# Patient Record
Sex: FEMALE | Race: WHITE | NOT HISPANIC OR LATINO | Employment: OTHER | URBAN - METROPOLITAN AREA
[De-identification: names, ages, dates, MRNs, and addresses within clinical notes are randomized per-mention and may not be internally consistent; named-entity substitution may affect disease eponyms.]

---

## 2020-02-07 ENCOUNTER — OFFICE VISIT (OUTPATIENT)
Dept: INTERNAL MEDICINE | Facility: CLINIC | Age: 35
End: 2020-02-07

## 2020-02-07 VITALS
RESPIRATION RATE: 16 BRPM | OXYGEN SATURATION: 100 % | HEIGHT: 67 IN | SYSTOLIC BLOOD PRESSURE: 102 MMHG | DIASTOLIC BLOOD PRESSURE: 60 MMHG | WEIGHT: 166.2 LBS | BODY MASS INDEX: 26.09 KG/M2 | HEART RATE: 80 BPM | TEMPERATURE: 97.8 F

## 2020-02-07 DIAGNOSIS — J30.9 ALLERGIC RHINITIS, UNSPECIFIED SEASONALITY, UNSPECIFIED TRIGGER: ICD-10-CM

## 2020-02-07 DIAGNOSIS — H69.83 DYSFUNCTION OF BOTH EUSTACHIAN TUBES: ICD-10-CM

## 2020-02-07 DIAGNOSIS — J01.90 ACUTE SINUSITIS, RECURRENCE NOT SPECIFIED, UNSPECIFIED LOCATION: ICD-10-CM

## 2020-02-07 DIAGNOSIS — C73 THYROID CANCER (HCC): Primary | ICD-10-CM

## 2020-02-07 PROCEDURE — 96372 THER/PROPH/DIAG INJ SC/IM: CPT | Performed by: NURSE PRACTITIONER

## 2020-02-07 PROCEDURE — 99203 OFFICE O/P NEW LOW 30 MIN: CPT | Performed by: NURSE PRACTITIONER

## 2020-02-07 RX ORDER — LEVOTHYROXINE SODIUM 0.15 MG/1
TABLET ORAL
COMMUNITY
End: 2020-06-19 | Stop reason: SDUPTHER

## 2020-02-07 RX ORDER — CETIRIZINE HYDROCHLORIDE 10 MG/1
10 TABLET ORAL DAILY
COMMUNITY
End: 2020-04-01

## 2020-02-07 RX ORDER — AMOXICILLIN AND CLAVULANATE POTASSIUM 875; 125 MG/1; MG/1
1 TABLET, FILM COATED ORAL 2 TIMES DAILY
Qty: 20 TABLET | Refills: 0 | Status: SHIPPED | OUTPATIENT
Start: 2020-02-07 | End: 2020-04-01

## 2020-02-07 RX ORDER — FLUTICASONE PROPIONATE 50 MCG
2 SPRAY, SUSPENSION (ML) NASAL DAILY
COMMUNITY

## 2020-02-07 RX ORDER — AZELASTINE 1 MG/ML
2 SPRAY, METERED NASAL 2 TIMES DAILY
Qty: 1 EACH | Refills: 12 | Status: SHIPPED | OUTPATIENT
Start: 2020-02-07

## 2020-02-07 RX ORDER — METHYLPREDNISOLONE ACETATE 80 MG/ML
80 INJECTION, SUSPENSION INTRA-ARTICULAR; INTRALESIONAL; INTRAMUSCULAR; SOFT TISSUE ONCE
Status: COMPLETED | OUTPATIENT
Start: 2020-02-07 | End: 2020-02-07

## 2020-02-07 RX ORDER — LEVOTHYROXINE SODIUM 175 UG/1
TABLET ORAL
COMMUNITY
End: 2020-11-03

## 2020-02-07 RX ADMIN — METHYLPREDNISOLONE ACETATE 80 MG: 80 INJECTION, SUSPENSION INTRA-ARTICULAR; INTRALESIONAL; INTRAMUSCULAR; SOFT TISSUE at 15:42

## 2020-02-07 NOTE — PROGRESS NOTES
Subjective   Kennedi Medina is a 34 y.o. female    Chief Complaint   Patient presents with   • Establish Care   • Thyroid Cancer     Was seeing Endo Dr. Cayden Win. But she moved. Discuss other recomendations as to who she can see.    • Allergic Rhinitis     Nasal congestion, drainage, runny nose. has been taking Mucinex, Flonase and Zyrtec     History of Present Illness     New pt here for to establish care.      Thyroid CA - dx'd in 2009; complete thyroidectomy in 09 as well, followed by one round of radioactive iodine.  Has been followed by Dr. Win, but she is leaving [place] and she needs referral to new Endo.      Allergic rhinitis - long standing problems with allergies.  She works at a horse farm and trains horses.  She feels that horse dander and hay are big triggers for her.  She does fine as long as she takes her Zyrtec and uses her flonase every day.  She failed to do so one day last week and now has terrible sinus pressure, congestion.  Has not tried any additional tx's for her sx's.      Thinks she has had the Hep A series.  Will ck with labs at [facility].      Past Medical History:   Diagnosis Date   • Thyroid cancer (CMS/[facility]) 2009    1 round of radioactive iodine     Past Surgical History:   Procedure Laterality Date   • RHINOPLASTY  06/2003   • THYROIDECTOMY  02/2009     No Known Allergies    Family History   Problem Relation Age of Onset   • Skin cancer Mother    • Skin cancer Father    • Skin cancer Paternal Uncle         melanoma   • Cancer Maternal Grandmother    • Cancer Maternal Grandfather    • Breast cancer Paternal Grandmother    • Cancer Paternal Grandfather    • No Known Problems Brother      Social History     Socioeconomic History   • Marital status:      Spouse name: Not on file   • Number of children: Not on file   • Years of education: Not on file   • Highest education level: Not on file   Tobacco Use   • Smoking status: Never Smoker   • Smokeless tobacco: Never Used   Substance  and Sexual Activity   • Alcohol use: Yes     Alcohol/week: 2.0 - 5.0 standard drinks     Types: 2 - 5 Standard drinks or equivalent per week     Comment: 2-5 drinks per week   • Drug use: Never   • Sexual activity: Yes     Partners: Male     Birth control/protection: None     Comment:          The following portions of the patient's history were reviewed and updated as appropriate: allergies, current medications, past family history, past medical history, past social history, past surgical history and problem list.    Current Outpatient Medications:   •  cetirizine (zyrTEC) 10 MG tablet, Take 10 mg by mouth Daily., Disp: , Rfl:   •  fluticasone (FLONASE) 50 MCG/ACT nasal spray, 2 sprays into the nostril(s) as directed by provider Daily., Disp: , Rfl:   •  levothyroxine (SYNTHROID, LEVOTHROID) 150 MCG tablet, Take 1 tablet by mouth daily. Monday-Friday., Disp: , Rfl:   •  levothyroxine (SYNTHROID, LEVOTHROID) 175 MCG tablet, Take 1 tablet by mouth on Saturday and Sunday, Disp: , Rfl:   •  amoxicillin-clavulanate (AUGMENTIN) 875-125 MG per tablet, Take 1 tablet by mouth 2 (Two) Times a Day., Disp: 20 tablet, Rfl: 0  •  azelastine (ASTELIN) 0.1 % nasal spray, 2 sprays into the nostril(s) as directed by provider 2 (Two) Times a Day. Use in each nostril as directed, Disp: 1 each, Rfl: 12     Review of Systems   Constitutional: Negative for chills, fatigue and fever.   HENT: Positive for congestion, postnasal drip, rhinorrhea, sinus pressure and sinus pain.    Respiratory: Negative for cough, chest tightness and shortness of breath.    Cardiovascular: Negative for chest pain.   Gastrointestinal: Negative for abdominal pain, diarrhea, nausea and vomiting.   Endocrine: Negative for cold intolerance and heat intolerance.   Musculoskeletal: Negative for arthralgias.   Neurological: Negative for dizziness.       Objective   Physical Exam   Constitutional: She is oriented to person, place, and time. She appears  "well-developed and well-nourished.   HENT:   Head: Normocephalic and atraumatic.   Right Ear: A middle ear effusion is present.   Left Ear: A middle ear effusion is present.   Nose: Mucosal edema and rhinorrhea present. Right sinus exhibits maxillary sinus tenderness and frontal sinus tenderness. Left sinus exhibits maxillary sinus tenderness and frontal sinus tenderness.   Mouth/Throat: Posterior oropharyngeal erythema present.   Eyes: Pupils are equal, round, and reactive to light. Conjunctivae and EOM are normal.   Neck: Normal range of motion.   Cardiovascular: Normal rate, regular rhythm and normal heart sounds.   Pulmonary/Chest: Effort normal and breath sounds normal.   Abdominal: Soft. Bowel sounds are normal.   Musculoskeletal: Normal range of motion.   Neurological: She is alert and oriented to person, place, and time. She has normal reflexes.   Skin: Skin is warm and dry.   Psychiatric: She has a normal mood and affect. Her behavior is normal. Judgment and thought content normal.     Vitals:    02/07/20 1457   BP: 102/60   Pulse: 80   Resp: 16   Temp: 97.8 °F (36.6 °C)   TempSrc: Temporal   SpO2: 100%   Weight: 75.4 kg (166 lb 3.2 oz)   Height: 169.2 cm (66.61\")         Assessment/Plan   Kennedi was seen today for establish care, thyroid cancer and allergic rhinitis.    Diagnoses and all orders for this visit:    Thyroid cancer (CMS/[facility])  -     Ambulatory Referral to Endocrinology    Acute sinusitis, recurrence not specified, unspecified location  -     amoxicillin-clavulanate (AUGMENTIN) 875-125 MG per tablet; Take 1 tablet by mouth 2 (Two) Times a Day.  -     methylPREDNISolone acetate (DEPO-medrol) injection 80 mg    Dysfunction of both eustachian tubes  -     azelastine (ASTELIN) 0.1 % nasal spray; 2 sprays into the nostril(s) as directed by provider 2 (Two) Times a Day. Use in each nostril as directed  -     methylPREDNISolone acetate (DEPO-medrol) injection 80 mg    Allergic rhinitis, unspecified " seasonality, unspecified trigger  -     azelastine (ASTELIN) 0.1 % nasal spray; 2 sprays into the nostril(s) as directed by provider 2 (Two) Times a Day. Use in each nostril as directed  -     methylPREDNISolone acetate (DEPO-medrol) injection 80 mg    Referred to Endo  Covered with Augmentin for sinusitis  Will add Astelin to allergy meds - continue with Zyrtec and flonase  Steroid injection given in office  Declines allergist referral for now  Return in about 3 months (around 5/7/2020) for Annual.

## 2020-04-01 ENCOUNTER — TELEMEDICINE (OUTPATIENT)
Dept: ENDOCRINOLOGY | Facility: CLINIC | Age: 35
End: 2020-04-01

## 2020-04-01 ENCOUNTER — TELEPHONE (OUTPATIENT)
Dept: ENDOCRINOLOGY | Facility: CLINIC | Age: 35
End: 2020-04-01

## 2020-04-01 DIAGNOSIS — C73 THYROID CANCER (HCC): Primary | ICD-10-CM

## 2020-04-01 DIAGNOSIS — E89.0 POSTSURGICAL HYPOTHYROIDISM: ICD-10-CM

## 2020-04-01 PROBLEM — J30.2 SEASONAL ALLERGIES: Status: ACTIVE | Noted: 2020-04-01

## 2020-04-01 PROCEDURE — 99243 OFF/OP CNSLTJ NEW/EST LOW 30: CPT | Performed by: INTERNAL MEDICINE

## 2020-04-01 RX ORDER — LEVOCETIRIZINE DIHYDROCHLORIDE 5 MG/1
5 TABLET, FILM COATED ORAL DAILY
COMMUNITY

## 2020-04-01 NOTE — TELEPHONE ENCOUNTER
The office is closed today, Thursday and Friday.  Unable to leave a message.    I will call the office on Monday, to request records, since I don't have a fax to fax the request to.

## 2020-04-01 NOTE — TELEPHONE ENCOUNTER
----- Message from Dina Hernandes MD sent at 4/1/2020 10:40 AM EDT -----  Could you please assist in obtaining prior Endocrine records for patient from the office of Dr. CINDI Win? Patient last seen approximately 1 year ago.     If possible, request most recent office notes, labs, imaging, surgical pathology if available.    Thank you

## 2020-04-01 NOTE — PROGRESS NOTES
Chief Complaint   Patient presents with   • Thyroid Cancer   • Establish Care        New patient who is being seen in consultation regarding thyroid cancer at the request of Kelly Johnson AP* HPI   Kennedi Medina is a 34 y.o. female who presents to establish care with a new provider for thyroid cancer, postsurgical hypothyroidism.  This was an audio and video enabled telemedicine encounter.        She was previously followed by Dr. CARL Win was last seen approximately 1 year ago.    Patient reports that her family number noticed a visible nodule around Christmas 2008.  She later had an FNA which was suspicious for carcinoma.  She underwent thyroidectomy in approximately March/April 2009 in [facility].  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  She reports she had radioactive iodine ablation in approximately May 2009.  She reports that she did not require hospitalization following administration of radioactive iodine.  Patient reports that she underwent whole-body scan following radioactive iodine again about 6 months later.  She reports that she may have had one additional scan but is not sure and was told that she does not need further imaging.  Most recently, she has been followed with annual labs.  She reports no recent change in her dose of thyroid hormone.  Patient is currently taking levothyroxine 150 mcg Monday through Friday and 175 mcg Saturday and Sunday.  She does report that her TSH has been Slightly low in the past but she is not sure of the exact goal.    Patient does report some recent intentional weight loss.  Patient denies any recent changes in bowel habits.  Patient denies any palpitations or increased anxiety.  Patient reports energy level is good.  Patient denies any difficulty breathing, difficulty swallowing or changes in her neck.    Past Medical History:   Diagnosis Date   • Hypothyroidism    •  Thyroid cancer (CMS/[facility]) 2009    1 round of radioactive iodine     Past Surgical History:   Procedure Laterality Date   • RHINOPLASTY  06/2003   • THYROIDECTOMY  02/2009      Family History   Problem Relation Age of Onset   • Skin cancer Mother    • Skin cancer Father    • Skin cancer Paternal Uncle         melanoma   • Cancer Maternal Grandmother    • Cancer Maternal Grandfather    • Breast cancer Paternal Grandmother    • Cancer Paternal Grandfather    • No Known Problems Brother       Social History     Socioeconomic History   • Marital status:      Spouse name: Not on file   • Number of children: Not on file   • Years of education: Not on file   • Highest education level: Not on file   Tobacco Use   • Smoking status: Never Smoker   • Smokeless tobacco: Never Used   Substance and Sexual Activity   • Alcohol use: Yes     Alcohol/week: 2.0 - 5.0 standard drinks     Types: 2 - 5 Standard drinks or equivalent per week     Comment: 2-5 drinks per week   • Drug use: Never   • Sexual activity: Yes     Partners: Male     Birth control/protection: None     Comment:       No Known Allergies   Current Outpatient Medications on File Prior to Visit   Medication Sig Dispense Refill   • azelastine (ASTELIN) 0.1 % nasal spray 2 sprays into the nostril(s) as directed by provider 2 (Two) Times a Day. Use in each nostril as directed 1 each 12   • fluticasone (FLONASE) 50 MCG/ACT nasal spray 2 sprays into the nostril(s) as directed by provider Daily.     • levocetirizine (XYZAL) 5 MG tablet Take 5 mg by mouth Daily.     • levothyroxine (SYNTHROID, LEVOTHROID) 150 MCG tablet Take 1 tablet by mouth daily. Monday-Friday.     • levothyroxine (SYNTHROID, LEVOTHROID) 175 MCG tablet Take 1 tablet by mouth on Saturday and Sunday     • [DISCONTINUED] amoxicillin-clavulanate (AUGMENTIN) 875-125 MG per tablet Take 1 tablet by mouth 2 (Two) Times a Day. 20 tablet 0   • [DISCONTINUED] cetirizine (zyrTEC) 10 MG tablet Take 10 mg by  mouth Daily.       No current facility-administered medications on file prior to visit.         Review of Systems   Constitutional: Negative for fatigue and unexpected weight gain.   HENT: Negative for trouble swallowing and voice change.    Eyes: Negative for itching and visual disturbance.   Respiratory: Negative for cough and shortness of breath.    Cardiovascular: Negative for chest pain and palpitations.   Gastrointestinal: Negative for constipation and diarrhea.   Endocrine: Negative for cold intolerance and heat intolerance.   Musculoskeletal: Negative for arthralgias and myalgias.   Skin: Negative for dry skin and rash.   Neurological: Negative for tremors and numbness.   Psychiatric/Behavioral: Negative for sleep disturbance. The patient is nervous/anxious.         There were no vitals filed for this visit.There is no height or weight on file to calculate BMI.     Physical Exam   General: Well-appearing, no acute distress.    Labs/Imaging  No recent labs or imaging available.    Assessment and Plan    Kennedi was seen today for thyroid cancer and establish care.    Diagnoses and all orders for this visit:    Thyroid cancer (CMS/[facility])  -Patient reports total thyroidectomy for encapsulated papillary microcarcinoma in approximately March 2009.  -Patient reports that she subsequently underwent radioactive iodine treatment, per her history, this was likely a remnant ablation.  -Patient reports post therapy whole body scan was normal and she is recently been monitored with annual labs and ultrasound.  -No previous records available at time of visit.  Patient previously followed by Dr. Win.  Will request records from her office to verify the above history.  -On levothyroxine 150 mcg Monday through Friday and 175 mcg on Saturday and Sunday.  Patient denies any recent dose change.  -Patient reports last labs were approximately 1 year ago, but she is likely due for thyroglobulin and thyroid function testing which were  ordered today.  -We will determine TSH will need for subsequent screening with imaging upon review of patient records.    Postsurgical hypothyroidism  -On levothyroxine 150 mcg Monday through Friday and 175 mcg on Saturday and Sunday.   -Continue current levothyroxine dosing for now.  -Obtain TFTs, as above  -We will determine TSH goal after prior records reviewed.      Addendum dated 4/9/20  Received results of some prior endocrinology labs- still awaiting additional labs, imaging, office notes.  Labs dated 3/15/2018  TSH 2.68  Free T4 1.34    Labs dated 3/16/2017  TSH 0.363  Free T4 1.49    Labs dated 1/20/2016  TSH 0.175  Free T4 1.54    Labs dated 9/8/2015  TSH 0.242  Free T3 3.3  Free T4 1.40    Received office note dated 3/20/2019  HPI notes that patient had subtotal thyroidectomy in [place] in February 2009, she subsequently received 50 mCi of JEAN-BAPTISTE in June 2009 for a 1.8 cm papillary carcinoma of the left thyroid with clear margins.  Whole-body scan showed uptake in the sternal notch area.  Whole-body scan in 2010 was negative.  TSH was previously noted to be suppressed 175 mcg daily.  Thyroglobulin was undetectable.  Thyroid ultrasound at that visit revealed postsurgical neck, no evidence of recurrent tissue.  Small bilateral lymph nodes none of which were entirely suspicious.  At that visit, provider recommended TSH be kept less than 1.    Received labs from March 15, 2018  TSH 2.68  Free T4 1.34  Thyroglobulin less than 0.1  Thyroglobulin antibody less than 0.4    Received labs dated March 16, 2017  TSH 0.363  Free T4 1.49  Thyroglobulin antibody less than 0.4  Thyroglobulin less than 0.1       Return in about 3 months (around 7/1/2020) for Next scheduled follow up. The patient was instructed to contact the clinic with any interval questions or concerns.    Dina Hernandes MD     Please note that portions of this document were completed using a voice recognition program. Efforts were made to edit the  dictations, but occasionally words are mis-transcribed.

## 2020-04-08 ENCOUNTER — TELEPHONE (OUTPATIENT)
Dept: ENDOCRINOLOGY | Facility: CLINIC | Age: 35
End: 2020-04-08

## 2020-04-09 DIAGNOSIS — E89.0 POSTSURGICAL HYPOTHYROIDISM: ICD-10-CM

## 2020-04-09 DIAGNOSIS — E89.0 POSTSURGICAL HYPOTHYROIDISM: Primary | ICD-10-CM

## 2020-04-09 DIAGNOSIS — C73 THYROID CANCER (HCC): Primary | ICD-10-CM

## 2020-04-09 NOTE — TELEPHONE ENCOUNTER
Note from Dr. Hernandes:    Received some labs, but still need last office note, prior thyroglobulin results, most recent imaging (thyroid US) results.

## 2020-04-14 DIAGNOSIS — C73 THYROID CANCER (HCC): Primary | ICD-10-CM

## 2020-06-19 ENCOUNTER — TELEPHONE (OUTPATIENT)
Dept: ENDOCRINOLOGY | Facility: CLINIC | Age: 35
End: 2020-06-19

## 2020-06-19 DIAGNOSIS — E89.0 POSTSURGICAL HYPOTHYROIDISM: Primary | ICD-10-CM

## 2020-06-19 RX ORDER — LEVOTHYROXINE SODIUM 0.15 MG/1
TABLET ORAL
Qty: 60 TABLET | Refills: 1 | Status: SHIPPED | OUTPATIENT
Start: 2020-06-19 | End: 2020-11-03

## 2020-06-19 NOTE — TELEPHONE ENCOUNTER
----- Message from Kennedi Medina sent at 6/17/2020  7:53 AM EDT -----  Regarding: Prescription Question  Contact: [phone]  Hi, I am running low on my 150 levithyroxin (sp?) Would you be able to call more into that for me? I use [facility] the phone number is [phone]. I also just remembered that I was supposed to do labs at some point in June and completely forgot. Where can I go to get those labs done and do I need to bring anything with me or do they know I'm coming? Thank you so much

## 2020-06-22 ENCOUNTER — TELEPHONE (OUTPATIENT)
Dept: ENDOCRINOLOGY | Facility: CLINIC | Age: 35
End: 2020-06-22

## 2020-06-22 NOTE — TELEPHONE ENCOUNTER
[facility] IS REQUESTING A RETURN CALL REGARDING THIS PATIENT'S LEVOTHYROXINE ORDER. THEY ARE NEEDING SOME CLARIFICATION AND THE PATIENT HAS ASKED [facility] TO PUT A RUSH ON REFILLING THIS RX.    CALL BACK [phone]     REF [id number]

## 2020-06-22 NOTE — TELEPHONE ENCOUNTER
[facility] needed to clarify if Dr. Hernandes wanted Generic or Name brand for medication.    Per verbal order from Dr. Hernandes, she does not have a preference over generic or name brand.    Pharmacy will be dispensing the generic brand for Levothyroxine

## 2020-06-23 ENCOUNTER — LAB (OUTPATIENT)
Dept: LAB | Facility: HOSPITAL | Age: 35
End: 2020-06-23

## 2020-06-23 ENCOUNTER — LAB (OUTPATIENT)
Dept: ENDOCRINOLOGY | Facility: CLINIC | Age: 35
End: 2020-06-23

## 2020-06-23 DIAGNOSIS — C73 THYROID CANCER (HCC): ICD-10-CM

## 2020-06-23 PROCEDURE — 36415 COLL VENOUS BLD VENIPUNCTURE: CPT

## 2020-06-23 PROCEDURE — 84432 ASSAY OF THYROGLOBULIN: CPT | Performed by: INTERNAL MEDICINE

## 2020-06-23 PROCEDURE — 84443 ASSAY THYROID STIM HORMONE: CPT | Performed by: INTERNAL MEDICINE

## 2020-06-23 PROCEDURE — 86800 THYROGLOBULIN ANTIBODY: CPT | Performed by: INTERNAL MEDICINE

## 2020-06-23 PROCEDURE — 84439 ASSAY OF FREE THYROXINE: CPT | Performed by: INTERNAL MEDICINE

## 2020-06-24 LAB
T4 FREE SERPL-MCNC: 1.52 NG/DL (ref 0.93–1.7)
TSH SERPL DL<=0.05 MIU/L-ACNC: 1.72 UIU/ML (ref 0.27–4.2)

## 2020-06-26 LAB — REF LAB TEST METHOD: NORMAL

## 2020-07-02 ENCOUNTER — OFFICE VISIT (OUTPATIENT)
Dept: ENDOCRINOLOGY | Facility: CLINIC | Age: 35
End: 2020-07-02

## 2020-07-02 VITALS
WEIGHT: 169.4 LBS | HEIGHT: 67 IN | OXYGEN SATURATION: 99 % | DIASTOLIC BLOOD PRESSURE: 60 MMHG | BODY MASS INDEX: 26.59 KG/M2 | SYSTOLIC BLOOD PRESSURE: 122 MMHG | HEART RATE: 76 BPM

## 2020-07-02 DIAGNOSIS — C73 THYROID CANCER (HCC): Primary | ICD-10-CM

## 2020-07-02 DIAGNOSIS — E89.0 POSTSURGICAL HYPOTHYROIDISM: ICD-10-CM

## 2020-07-02 PROCEDURE — 99213 OFFICE O/P EST LOW 20 MIN: CPT | Performed by: INTERNAL MEDICINE

## 2020-07-02 NOTE — PROGRESS NOTES
"Chief Complaint   Patient presents with   • Follow-up   • Thyroid Cancer        HPI   Kennedi Mukherjee is a 35 y.o. female had concerns including Follow-up and Thyroid Cancer.      Patient reports that she has been doing well in the interim since her last visit.  She denies any significant health changes or changes in medications.  She denies any concerning changes in her neck, difficulty breathing or difficulty swallowing. She continues on levothyroxine 150 mcg daily Monday-Friday and 175 mcg Saturday and Sunday. She denies weight changes, palpiltations, changes in bowel habits, heat intolerance or cold intolerance. She takes her medication first thing in the morning on an empty stomach and denies any missed doses.    The following portions of the patient's history were reviewed and updated as appropriate: allergies and past social history. medications    Review of Systems   Constitutional: Negative for unexpected weight gain and unexpected weight loss.   HENT: Negative for trouble swallowing and voice change.    Respiratory: Negative for cough and shortness of breath.    Cardiovascular: Negative for chest pain and palpitations.   Gastrointestinal: Negative for constipation and diarrhea.   Endocrine: Negative for cold intolerance and heat intolerance.        /60   Pulse 76   Ht 169.2 cm (66.61\")   Wt 76.8 kg (169 lb 6.4 oz)   SpO2 99%   BMI 26.84 kg/m²      Physical Exam      Constitutional:  well developed; well nourished  no acute distress  appears stated age   ENT/Thyroid: surgically absent   Eyes: Conjunctiva: clear   Respiratory:  breathing is unlabored  clear to auscultation bilaterally   Cardiovascular:  regular rate and rhythm   Chest:  Not performed.   Abdomen: soft, non-tender; no masses   : Not performed.   Musculoskeletal: Not performed   Skin: dry and warm   Neuro: mental status, speech normal   Psych: mood and affect are within normal limits       Labs/Imaging    Results for KENNEDI MUKHERJEE " (MRN [id number]) as of 7/2/2020 15:22   Ref. Range 6/23/2020 11:51   TSH Baseline Latest Ref Range: 0.270 - 4.200 uIU/mL 1.720   Free T4 Latest Ref Range: 0.93 - 1.70 ng/dL 1.52     Thyroglobulin antibody less than 1  Thyroglobulin 0.1    Kennedi was seen today for follow-up and thyroid cancer.    Diagnoses and all orders for this visit:    Thyroid cancer (CMS/[facility])  -Status post subtotal thyroidectomy in February 2009 followed by 50 mCi of radioactive iodine in June 2009 for a 1.8 cm papillary thyroid carcinoma.  Whole-body scan in 2010 reportedly negative.  - Previously followed by Dr. Win, last US 1 year ago  - recent TG and TGAb as above  - given available data, as patient is now 11 years out from diagnosis, she would likely classify as low risk  - aim for TSH <2.5  - Will plan to obtain baseline thyroid US    Postsurgical hypothyroidism  -Currently taking levothyroxine 175 mcg Saturday and Sunday, 150 mcg Monday- Friday  - TSH at goal, continue current dose  - reviewed appropriate administration of thyroid hormone  - discussed importance of thyroid hormone in pregnancy, patient to contact the clinic if she were to discover she is pregnant    Patient instructed to contact the office in the interim between visits with any concerning changes.    Addendum dated 12/14/2020  Received lab results dated 3/20/2019  Thyroglobulin less than 0.1  Antithyroglobulin antibodies less than 1 TSH 1.68  Free T4-5 point    Return in about 1 year (around 7/2/2021) for Next scheduled follow up. The patient was instructed to contact the clinic with any interval questions or concerns.    Dina Hernandes MD   Endocrinologist    Please note that portions of this document were completed with a voice recognition program. Efforts were made to edit the dictations, but occasionally words are mis-transcribed.

## 2020-07-30 ENCOUNTER — OFFICE VISIT (OUTPATIENT)
Dept: ENDOCRINOLOGY | Facility: CLINIC | Age: 35
End: 2020-07-30

## 2020-07-30 DIAGNOSIS — Z85.850 HISTORY OF THYROID CANCER: Primary | ICD-10-CM

## 2020-07-30 PROCEDURE — 76536 US EXAM OF HEAD AND NECK: CPT | Performed by: INTERNAL MEDICINE

## 2020-07-30 NOTE — PROGRESS NOTES
Post-operative Neck Ultrasound  [facility] Endocrinology    PT: Kennedi Medina  : 1985  Date:  20  Indication: History of thyroid cancer.    Technique: Real time high resolution imaging of the anterior neck was performed in longitudinal and transverse planes.    Findings:   Compartment / Level I  (Submandibular): no abnormal lymph nodules bilaterally  Compartment / Level II  (Suprahyoid parajugular): no abnormal lymph nodules bilaterally  Compartment / Level III  (Infrahyoid supracricoid parajugular): no abnormal lymph nodules bilaterally  Compartment / Level IV (Infracricoid parajugular): no abnormal lymph nodules bilaterally  Compartment / Level V (Posterior cervical triangle): no abnormal lymph nodules bilaterally  Compartment / Level VI (Central compartment): no abnormal lymph nodules identified  Compartment / Level VII (Substernal space): no abnormal lymph nodules bilaterally    Impression: The structures of the neck are shifted medially as expected post thyroidectomy. The thyroid bed is unremarkable.   No abnormal lymph nodes were identified in surgical anatomic compartments I - VII.      Recommendation: Repeat Neck US recommended as clinically indicated.

## 2020-08-21 DIAGNOSIS — M79.645 FINGER PAIN, LEFT: Primary | ICD-10-CM

## 2020-09-02 ENCOUNTER — TELEPHONE (OUTPATIENT)
Dept: INTERNAL MEDICINE | Facility: CLINIC | Age: 35
End: 2020-09-02

## 2020-09-02 NOTE — TELEPHONE ENCOUNTER
Referral faxed to Edvin at [facility] location as requested by patient. They will contact patient to schedule.

## 2020-11-03 DIAGNOSIS — E89.0 POSTSURGICAL HYPOTHYROIDISM: Primary | ICD-10-CM

## 2020-11-03 DIAGNOSIS — E89.0 POSTSURGICAL HYPOTHYROIDISM: ICD-10-CM

## 2020-11-03 RX ORDER — LEVOTHYROXINE SODIUM 0.15 MG/1
TABLET ORAL
Qty: 60 TABLET | Refills: 1 | Status: SHIPPED | OUTPATIENT
Start: 2020-11-03 | End: 2020-11-09 | Stop reason: SDUPTHER

## 2020-11-03 RX ORDER — LEVOTHYROXINE SODIUM 175 UG/1
175 TABLET ORAL DAILY
Qty: 24 TABLET | Refills: 1 | Status: SHIPPED | OUTPATIENT
Start: 2020-11-03 | End: 2020-11-09 | Stop reason: SDUPTHER

## 2020-11-03 NOTE — TELEPHONE ENCOUNTER
Patient requesting refill for Levothyroxine.    LOV:  07/30/20  Future visit:  07/21/21    Last refill:  06/19/20  Q.  60  R.  1      [facility].    Rx pending.

## 2020-11-09 DIAGNOSIS — E89.0 POSTSURGICAL HYPOTHYROIDISM: ICD-10-CM

## 2020-11-09 RX ORDER — LEVOTHYROXINE SODIUM 175 UG/1
175 TABLET ORAL DAILY
Qty: 24 TABLET | Refills: 0 | Status: SHIPPED | OUTPATIENT
Start: 2020-11-09 | End: 2020-11-09 | Stop reason: SDUPTHER

## 2020-11-09 RX ORDER — LEVOTHYROXINE SODIUM 0.15 MG/1
TABLET ORAL
Qty: 60 TABLET | Refills: 1 | Status: SHIPPED | OUTPATIENT
Start: 2020-11-09 | End: 2021-04-27 | Stop reason: SDUPTHER

## 2020-11-09 RX ORDER — LEVOTHYROXINE SODIUM 175 UG/1
175 TABLET ORAL DAILY
Qty: 24 TABLET | Refills: 1 | Status: SHIPPED | OUTPATIENT
Start: 2020-11-09 | End: 2021-04-27 | Stop reason: SDUPTHER

## 2020-11-09 RX ORDER — LEVOTHYROXINE SODIUM 0.15 MG/1
TABLET ORAL
Qty: 60 TABLET | Refills: 0 | Status: SHIPPED | OUTPATIENT
Start: 2020-11-09 | End: 2020-11-09 | Stop reason: SDUPTHER

## 2021-04-27 DIAGNOSIS — E89.0 POSTSURGICAL HYPOTHYROIDISM: ICD-10-CM

## 2021-04-27 RX ORDER — LEVOTHYROXINE SODIUM 0.15 MG/1
TABLET ORAL
Qty: 60 TABLET | Refills: 1 | Status: SHIPPED | OUTPATIENT
Start: 2021-04-27 | End: 2021-07-28 | Stop reason: SDUPTHER

## 2021-04-27 RX ORDER — LEVOTHYROXINE SODIUM 175 UG/1
175 TABLET ORAL DAILY
Qty: 24 TABLET | Refills: 1 | Status: SHIPPED | OUTPATIENT
Start: 2021-04-27 | End: 2021-07-28 | Stop reason: SDUPTHER

## 2021-04-27 NOTE — TELEPHONE ENCOUNTER
Patient requesting refills for her Levothyroxine 150 mcg and 175 mcg via [employer] portal.    LOV:  07/2/20  Future visit:  07/12/21    Rx pending.

## 2021-07-15 ENCOUNTER — LAB (OUTPATIENT)
Dept: LAB | Facility: HOSPITAL | Age: 36
End: 2021-07-15

## 2021-07-15 ENCOUNTER — OFFICE VISIT (OUTPATIENT)
Dept: ENDOCRINOLOGY | Facility: CLINIC | Age: 36
End: 2021-07-15

## 2021-07-15 VITALS
SYSTOLIC BLOOD PRESSURE: 122 MMHG | BODY MASS INDEX: 27.59 KG/M2 | HEART RATE: 68 BPM | HEIGHT: 67 IN | OXYGEN SATURATION: 99 % | WEIGHT: 175.8 LBS | DIASTOLIC BLOOD PRESSURE: 72 MMHG

## 2021-07-15 DIAGNOSIS — C73 THYROID CANCER (HCC): Primary | ICD-10-CM

## 2021-07-15 DIAGNOSIS — E89.0 POSTSURGICAL HYPOTHYROIDISM: ICD-10-CM

## 2021-07-15 LAB
T4 FREE SERPL-MCNC: 1.52 NG/DL (ref 0.93–1.7)
TSH SERPL DL<=0.05 MIU/L-ACNC: 1.18 UIU/ML (ref 0.27–4.2)

## 2021-07-15 PROCEDURE — 99214 OFFICE O/P EST MOD 30 MIN: CPT | Performed by: INTERNAL MEDICINE

## 2021-07-15 PROCEDURE — 84439 ASSAY OF FREE THYROXINE: CPT | Performed by: INTERNAL MEDICINE

## 2021-07-15 PROCEDURE — 36415 COLL VENOUS BLD VENIPUNCTURE: CPT | Performed by: INTERNAL MEDICINE

## 2021-07-15 PROCEDURE — 84432 ASSAY OF THYROGLOBULIN: CPT | Performed by: INTERNAL MEDICINE

## 2021-07-15 PROCEDURE — 86800 THYROGLOBULIN ANTIBODY: CPT | Performed by: INTERNAL MEDICINE

## 2021-07-15 PROCEDURE — 84443 ASSAY THYROID STIM HORMONE: CPT | Performed by: INTERNAL MEDICINE

## 2021-07-15 NOTE — PROGRESS NOTES
"Chief Complaint   Patient presents with   • Follow-up   • Hypothyroidism   • Thyroid Cancer        HPI   Kennedi Mukherjee is a 36 y.o. female had concerns including Follow-up, Hypothyroidism, and Thyroid Cancer.      Patient denies any interval health changes since her last visit.  She does report that her allergies have been worse past few weeks but no other new concerns.  Regarding her history of thyroid cancer, she denies any notable neck changes, difficulty breathing, difficulty swallowing.  Regarding her postoperative hypothyroidism, she continues on levothyroxine 150 mcg Monday through Friday, 175 mcg Saturday and Sunday.  She denies any weight changes, changes in bowel habits, palpitations.    The following portions of the patient's history were reviewed and updated as appropriate: allergies, current medications and past social history.    Review of Systems   Constitutional: Negative for unexpected weight gain and unexpected weight loss.   Cardiovascular: Negative for palpitations.   Gastrointestinal: Negative for constipation and diarrhea.   Endocrine: Negative for cold intolerance and heat intolerance.      /72   Pulse 68   Ht 169.2 cm (66.61\")   Wt 79.7 kg (175 lb 12.8 oz)   SpO2 99%   BMI 27.86 kg/m²      Physical Exam      Constitutional:  well developed; well nourished  no acute distress  appears stated age   ENT/Thyroid: surgically absent   Eyes: Conjunctiva: clear   Respiratory:  breathing is unlabored  clear to auscultation bilaterally   Cardiovascular:  regular rate and rhythm   Chest:  Not performed.   Abdomen: soft, non-tender; no masses   : Not performed.   Musculoskeletal: Not performed   Skin: dry and warm   Neuro: mental status, speech normal   Psych: mood and affect are within normal limits       Labs/Imaging  Results for KENNEDI MUKHERJEE (MRN [id number]) as of 7/15/2021 13:50   Ref. Range 6/23/2020 11:51   TSH Baseline Latest Ref Range: 0.270 - 4.200 uIU/mL 1.720   Free T4 Latest " Ref Range: 0.93 - 1.70 ng/dL 1.52       Diagnoses and all orders for this visit:    1. Thyroid cancer (CMS/HCC) (Primary)  Status post subtotal thyroidectomy in February 2009 followed by 50 mcg of radioactive iodine in June 2009 41.8 cm papillary thyroid cancer.  Whole-body scan reportedly negative in 2010.  Most recent thyroglobulin levels 0.1 in June 2020.  Thyroid ultrasound obtained July 2020, unremarkable  SCOOTER low risk based on available data, repeat labs today  Examination of thyroid gland unremarkable    2. Postsurgical hypothyroidism  Currently taking levothyroxine 150 mcg Monday through Friday, 175 on Saturday and Sunday  Clinically euthyroid  Goal TSH less than 2.5, repeat labs today  Reviewed increased thyroid hormone requirements in the event of pregnancy, patient will contact the office in the interim between visits if this were to occur.      Return in about 1 year (around 7/15/2022). The patient was instructed to contact the clinic with any interval questions or concerns.    Dina Hernandes MD   Endocrinologist    Please note that portions of this document were completed with a voice recognition program. Efforts were made to edit the dictations, but occasionally words are mis-transcribed.

## 2021-07-26 LAB — REF LAB TEST METHOD: NORMAL

## 2021-07-28 RX ORDER — LEVOTHYROXINE SODIUM 175 UG/1
TABLET ORAL
Qty: 24 TABLET | Refills: 1 | Status: SHIPPED | OUTPATIENT
Start: 2021-07-28 | End: 2021-09-22

## 2021-07-28 RX ORDER — LEVOTHYROXINE SODIUM 0.15 MG/1
TABLET ORAL
Qty: 60 TABLET | Refills: 1 | Status: SHIPPED | OUTPATIENT
Start: 2021-07-28 | End: 2021-09-22

## 2021-09-22 DIAGNOSIS — E89.0 POSTSURGICAL HYPOTHYROIDISM: ICD-10-CM

## 2021-09-22 RX ORDER — LEVOTHYROXINE SODIUM 175 UG/1
TABLET ORAL
Qty: 24 TABLET | Refills: 5 | Status: SHIPPED | OUTPATIENT
Start: 2021-09-22 | End: 2022-06-08 | Stop reason: SDUPTHER

## 2021-09-22 RX ORDER — LEVOTHYROXINE SODIUM 0.15 MG/1
TABLET ORAL
Qty: 60 TABLET | Refills: 5 | Status: SHIPPED | OUTPATIENT
Start: 2021-09-22 | End: 2022-06-08 | Stop reason: SDUPTHER

## 2021-10-19 ENCOUNTER — OFFICE VISIT (OUTPATIENT)
Dept: INTERNAL MEDICINE | Facility: CLINIC | Age: 36
End: 2021-10-19

## 2021-10-19 VITALS
HEIGHT: 67 IN | OXYGEN SATURATION: 98 % | TEMPERATURE: 97.3 F | SYSTOLIC BLOOD PRESSURE: 118 MMHG | DIASTOLIC BLOOD PRESSURE: 76 MMHG | WEIGHT: 174 LBS | RESPIRATION RATE: 16 BRPM | HEART RATE: 87 BPM | BODY MASS INDEX: 27.31 KG/M2

## 2021-10-19 DIAGNOSIS — R41.840 ATTENTION AND CONCENTRATION DEFICIT: ICD-10-CM

## 2021-10-19 DIAGNOSIS — Z01.84 IMMUNITY STATUS TESTING: ICD-10-CM

## 2021-10-19 DIAGNOSIS — E55.9 VITAMIN D DEFICIENCY: ICD-10-CM

## 2021-10-19 DIAGNOSIS — Z01.419 ROUTINE GYNECOLOGICAL EXAMINATION: ICD-10-CM

## 2021-10-19 DIAGNOSIS — C73 THYROID CANCER (HCC): ICD-10-CM

## 2021-10-19 DIAGNOSIS — Z12.31 ENCOUNTER FOR SCREENING MAMMOGRAM FOR MALIGNANT NEOPLASM OF BREAST: ICD-10-CM

## 2021-10-19 DIAGNOSIS — Z11.59 ENCOUNTER FOR HEPATITIS C SCREENING TEST FOR LOW RISK PATIENT: ICD-10-CM

## 2021-10-19 DIAGNOSIS — E89.0 POSTSURGICAL HYPOTHYROIDISM: ICD-10-CM

## 2021-10-19 DIAGNOSIS — Z80.3 FAMILY HISTORY OF BREAST CANCER IN FEMALE: ICD-10-CM

## 2021-10-19 DIAGNOSIS — Z00.00 ROUTINE GENERAL MEDICAL EXAMINATION AT A HEALTH CARE FACILITY: Primary | ICD-10-CM

## 2021-10-19 PROCEDURE — 99395 PREV VISIT EST AGE 18-39: CPT | Performed by: NURSE PRACTITIONER

## 2021-10-19 NOTE — PROGRESS NOTES
Subjective   Kennedi Medina is a 36 y.o. female    Chief Complaint   Patient presents with   • Annual Exam   • poor concentration     can not focus for a long time, anxiety level has increased, can not multi tasks and get certains tasks done     History of Present Illness     Here for annual exam    Thyroid CA - dx'd in 2009; complete thyroidectomy in 09 as well, followed by one round of radioactive iodine.  Now followed by Dr. Cecilio Billings.  Current regimen is 150 mcg M-F and 175 mcg on Sat and Sunday.     Decreased concentration - pt states that she has really felt this way her entire life, but within the last year it has gotten worse.  Feeling more anxious, overwhelmed and has a hard time making decisions.  She would like to pursue ADHD testing.      COVID - 2/23/2021 and 3/9/2021  Flu shot - 9/21/2021  Tdap - 2017  Hep A - thinks she had this before traveling to [facility] a few years ago, we can ck titers  Pap - updating today  Mamm - never, but would like to have early screening due to personal h/o cancer and + family h/o breast cancer    Diet - eats healthy balanced most of the time per her report    Exercise - works out 5 days/week    Social History     Tobacco Use   • Smoking status: Never Smoker   • Smokeless tobacco: Never Used   Vaping Use   • Vaping Use: Never used   Substance Use Topics   • Alcohol use: Yes     Alcohol/week: 2.0 - 5.0 standard drinks     Types: 2 - 5 Standard drinks or equivalent per week     Comment: 2-5 drinks per week   • Drug use: Never           The following portions of the patient's history were reviewed and updated as appropriate: allergies, current medications, past family history, past medical history, past social history, past surgical history and problem list.    Current Outpatient Medications:   •  azelastine (ASTELIN) 0.1 % nasal spray, 2 sprays into the nostril(s) as directed by provider 2 (Two) Times a Day. Use in each nostril as directed (Patient taking differently: 2 sprays  into the nostril(s) as directed by provider As Needed for Allergies. Use in each nostril as directed), Disp: 1 each, Rfl: 12  •  fluticasone (FLONASE) 50 MCG/ACT nasal spray, 2 sprays into the nostril(s) as directed by provider Daily., Disp: , Rfl:   •  levocetirizine (XYZAL) 5 MG tablet, Take 5 mg by mouth Daily., Disp: , Rfl:   •  levothyroxine (Synthroid) 150 MCG tablet, TAKE 1 TABLET DAILY MONDAY THROUGH FRIDAY, Disp: 60 tablet, Rfl: 5  •  levothyroxine (Synthroid) 175 MCG tablet, TAKE 1 TABLET DAILY ON     SATURDAY AND SUNDAY, Disp: 24 tablet, Rfl: 5     Review of Systems   Constitutional: Negative for appetite change, chills, fatigue, fever and unexpected weight change.   HENT: Negative for congestion, ear pain, nosebleeds, rhinorrhea and tinnitus.    Eyes: Negative for pain.   Respiratory: Negative for cough, chest tightness and shortness of breath.    Cardiovascular: Negative for chest pain, palpitations and leg swelling.   Gastrointestinal: Negative for abdominal distention, abdominal pain, blood in stool, constipation, diarrhea, nausea and vomiting.   Endocrine: Negative for cold intolerance, heat intolerance, polydipsia, polyphagia and polyuria.   Genitourinary: Negative for dysuria, flank pain, frequency, hematuria and urgency.   Musculoskeletal: Negative for arthralgias, back pain, gait problem, joint swelling, myalgias and neck pain.   Skin: Negative for color change, pallor, rash and wound.   Allergic/Immunologic: Negative for environmental allergies and food allergies.   Neurological: Negative for dizziness, syncope, weakness, light-headedness, numbness and headaches.   Hematological: Negative for adenopathy. Does not bruise/bleed easily.   Psychiatric/Behavioral: Negative for behavioral problems and suicidal ideas. The patient is not nervous/anxious.        Objective   Physical Exam  Vitals reviewed. Exam conducted with a chaperone present.   Constitutional:       Appearance: Normal appearance. She  is well-developed and normal weight.   HENT:      Head: Normocephalic and atraumatic.      Right Ear: External ear normal.      Left Ear: External ear normal.      Nose: Nose normal.      Mouth/Throat:      Mouth: Mucous membranes are moist.      Pharynx: Oropharynx is clear.   Eyes:      Conjunctiva/sclera: Conjunctivae normal.      Pupils: Pupils are equal, round, and reactive to light.   Cardiovascular:      Rate and Rhythm: Normal rate and regular rhythm.      Pulses: Normal pulses.           Carotid pulses are 2+ on the right side and 2+ on the left side.     Heart sounds: Normal heart sounds.   Pulmonary:      Effort: Pulmonary effort is normal.      Breath sounds: Normal breath sounds.   Chest:   Breasts: Breasts are symmetrical.      Right: No inverted nipple, mass, nipple discharge, skin change or tenderness.      Left: No inverted nipple, mass, nipple discharge, skin change or tenderness.       Abdominal:      General: Bowel sounds are normal.      Palpations: Abdomen is soft.   Genitourinary:     General: Normal vulva.      Pubic Area: No rash.       Vagina: Normal. No vaginal discharge.      Cervix: Normal.      Uterus: Normal.       Adnexa: Right adnexa normal and left adnexa normal.      Rectum: Normal. Guaiac result negative.   Musculoskeletal:         General: Normal range of motion.      Cervical back: Normal range of motion and neck supple.   Lymphadenopathy:      Head:      Right side of head: No tonsillar adenopathy.      Left side of head: No tonsillar adenopathy.      Cervical:      Right cervical: No superficial or posterior cervical adenopathy.     Left cervical: No superficial or posterior cervical adenopathy.   Skin:     General: Skin is warm and dry.      Capillary Refill: Capillary refill takes less than 2 seconds.   Neurological:      Mental Status: She is alert and oriented to person, place, and time.      Deep Tendon Reflexes: Reflexes are normal and symmetric.   Psychiatric:          "Mood and Affect: Mood normal.         Behavior: Behavior normal.         Thought Content: Thought content normal.         Judgment: Judgment normal.       Vitals:    10/19/21 1549   BP: 118/76   Cuff Size: Adult   Pulse: 87   Resp: 16   Temp: 97.3 °F (36.3 °C)   TempSrc: Infrared   SpO2: 98%   Weight: 78.9 kg (174 lb)   Height: 170.2 cm (67\")         Assessment/Plan   Diagnoses and all orders for this visit:    1. Routine general medical examination at a health care facility (Primary)  -     CBC & Differential; Future  -     Comprehensive Metabolic Panel; Future  -     Lipid Panel; Future  -     Cancel: TSH; Future  -     Vitamin B12; Future  -     Vitamin D 25 Hydroxy; Future    2. Routine gynecological examination  -     Liquid-based Pap Smear, Screening; Future    3. Postsurgical hypothyroidism  -     CBC & Differential; Future  -     Comprehensive Metabolic Panel; Future  -     Lipid Panel; Future  -     Cancel: TSH; Future  -     Vitamin B12; Future  -     Vitamin D 25 Hydroxy; Future    4. Thyroid cancer (HCC)  -     CBC & Differential; Future  -     Comprehensive Metabolic Panel; Future  -     Lipid Panel; Future  -     Cancel: TSH; Future  -     Vitamin B12; Future  -     Vitamin D 25 Hydroxy; Future    5. Vitamin D deficiency  -     CBC & Differential; Future  -     Comprehensive Metabolic Panel; Future  -     Lipid Panel; Future  -     Cancel: TSH; Future  -     Vitamin B12; Future  -     Vitamin D 25 Hydroxy; Future    6. Encounter for hepatitis C screening test for low risk patient  -     Hepatitis C Antibody; Future    7. Attention and concentration deficit  -     Ambulatory Referral to Psychiatry    8. Family history of breast cancer in female  -     Mammo Screening Digital Tomosynthesis Bilateral With CAD; Future    9. Encounter for screening mammogram for malignant neoplasm of breast  -     Mammo Screening Digital Tomosynthesis Bilateral With CAD; Future    10. Immunity status testing  -     " Hepatitis A Antibody, Total; Future      Pap and breast exam done  Pt is not fasting, so she will RTC for labs  Will ck Hep A antibody to determine immunity  Referred to Psych for ADHD testing  Will set up for baseline screening mammogram due to family h/o and personal h/o thyroid CA  Counseling - diet and exercise  Return in about 6 months (around 4/19/2022).

## 2021-10-27 ENCOUNTER — LAB (OUTPATIENT)
Dept: LAB | Facility: HOSPITAL | Age: 36
End: 2021-10-27

## 2021-10-27 DIAGNOSIS — E89.0 POSTSURGICAL HYPOTHYROIDISM: ICD-10-CM

## 2021-10-27 DIAGNOSIS — E55.9 VITAMIN D DEFICIENCY: ICD-10-CM

## 2021-10-27 DIAGNOSIS — Z00.00 ROUTINE GENERAL MEDICAL EXAMINATION AT A HEALTH CARE FACILITY: ICD-10-CM

## 2021-10-27 DIAGNOSIS — C73 THYROID CANCER (HCC): ICD-10-CM

## 2021-10-27 DIAGNOSIS — Z01.84 IMMUNITY STATUS TESTING: ICD-10-CM

## 2021-10-27 DIAGNOSIS — Z11.59 ENCOUNTER FOR HEPATITIS C SCREENING TEST FOR LOW RISK PATIENT: ICD-10-CM

## 2021-10-27 LAB
25(OH)D3 SERPL-MCNC: 30.9 NG/ML (ref 30–100)
ALBUMIN SERPL-MCNC: 4.7 G/DL (ref 3.5–5.2)
ALBUMIN/GLOB SERPL: 1.9 G/DL
ALP SERPL-CCNC: 48 U/L (ref 39–117)
ALT SERPL W P-5'-P-CCNC: 15 U/L (ref 1–33)
ANION GAP SERPL CALCULATED.3IONS-SCNC: 8.5 MMOL/L (ref 5–15)
AST SERPL-CCNC: 26 U/L (ref 1–32)
BASOPHILS # BLD AUTO: 0.05 10*3/MM3 (ref 0–0.2)
BASOPHILS NFR BLD AUTO: 1.8 % (ref 0–1.5)
BILIRUB SERPL-MCNC: 0.3 MG/DL (ref 0–1.2)
BUN SERPL-MCNC: 13 MG/DL (ref 6–20)
BUN/CREAT SERPL: 15.9 (ref 7–25)
CALCIUM SPEC-SCNC: 9.3 MG/DL (ref 8.6–10.5)
CHLORIDE SERPL-SCNC: 104 MMOL/L (ref 98–107)
CHOLEST SERPL-MCNC: 193 MG/DL (ref 0–200)
CO2 SERPL-SCNC: 24.5 MMOL/L (ref 22–29)
CREAT SERPL-MCNC: 0.82 MG/DL (ref 0.57–1)
DEPRECATED RDW RBC AUTO: 43.6 FL (ref 37–54)
EOSINOPHIL # BLD AUTO: 0.05 10*3/MM3 (ref 0–0.4)
EOSINOPHIL NFR BLD AUTO: 1.8 % (ref 0.3–6.2)
ERYTHROCYTE [DISTWIDTH] IN BLOOD BY AUTOMATED COUNT: 14.6 % (ref 12.3–15.4)
GFR SERPL CREATININE-BSD FRML MDRD: 79 ML/MIN/1.73
GLOBULIN UR ELPH-MCNC: 2.5 GM/DL
GLUCOSE SERPL-MCNC: 88 MG/DL (ref 65–99)
HCT VFR BLD AUTO: 38.9 % (ref 34–46.6)
HCV AB SER DONR QL: NORMAL
HDLC SERPL-MCNC: 67 MG/DL (ref 40–60)
HGB BLD-MCNC: 12.1 G/DL (ref 12–15.9)
IMM GRANULOCYTES # BLD AUTO: 0 10*3/MM3 (ref 0–0.05)
IMM GRANULOCYTES NFR BLD AUTO: 0 % (ref 0–0.5)
LDLC SERPL CALC-MCNC: 118 MG/DL (ref 0–100)
LDLC/HDLC SERPL: 1.76 {RATIO}
LYMPHOCYTES # BLD AUTO: 0.94 10*3/MM3 (ref 0.7–3.1)
LYMPHOCYTES NFR BLD AUTO: 33.9 % (ref 19.6–45.3)
MCH RBC QN AUTO: 25.5 PG (ref 26.6–33)
MCHC RBC AUTO-ENTMCNC: 31.1 G/DL (ref 31.5–35.7)
MCV RBC AUTO: 81.9 FL (ref 79–97)
MONOCYTES # BLD AUTO: 0.34 10*3/MM3 (ref 0.1–0.9)
MONOCYTES NFR BLD AUTO: 12.3 % (ref 5–12)
NEUTROPHILS NFR BLD AUTO: 1.39 10*3/MM3 (ref 1.7–7)
NEUTROPHILS NFR BLD AUTO: 50.2 % (ref 42.7–76)
NRBC BLD AUTO-RTO: 0 /100 WBC (ref 0–0.2)
PLATELET # BLD AUTO: 345 10*3/MM3 (ref 140–450)
PMV BLD AUTO: 9.9 FL (ref 6–12)
POTASSIUM SERPL-SCNC: 4.4 MMOL/L (ref 3.5–5.2)
PROT SERPL-MCNC: 7.2 G/DL (ref 6–8.5)
RBC # BLD AUTO: 4.75 10*6/MM3 (ref 3.77–5.28)
SODIUM SERPL-SCNC: 137 MMOL/L (ref 136–145)
TRIGL SERPL-MCNC: 42 MG/DL (ref 0–150)
VIT B12 BLD-MCNC: 347 PG/ML (ref 211–946)
VLDLC SERPL-MCNC: 8 MG/DL (ref 5–40)
WBC # BLD AUTO: 2.77 10*3/MM3 (ref 3.4–10.8)

## 2021-10-27 PROCEDURE — 85025 COMPLETE CBC W/AUTO DIFF WBC: CPT | Performed by: NURSE PRACTITIONER

## 2021-10-27 PROCEDURE — 82607 VITAMIN B-12: CPT | Performed by: NURSE PRACTITIONER

## 2021-10-27 PROCEDURE — 86803 HEPATITIS C AB TEST: CPT | Performed by: NURSE PRACTITIONER

## 2021-10-27 PROCEDURE — 86708 HEPATITIS A ANTIBODY: CPT | Performed by: NURSE PRACTITIONER

## 2021-10-27 PROCEDURE — 80061 LIPID PANEL: CPT | Performed by: NURSE PRACTITIONER

## 2021-10-27 PROCEDURE — 36415 COLL VENOUS BLD VENIPUNCTURE: CPT

## 2021-10-27 PROCEDURE — 80053 COMPREHEN METABOLIC PANEL: CPT | Performed by: NURSE PRACTITIONER

## 2021-10-27 PROCEDURE — 82306 VITAMIN D 25 HYDROXY: CPT | Performed by: NURSE PRACTITIONER

## 2021-10-28 LAB — HAV AB SER QL IA: POSITIVE

## 2021-11-09 DIAGNOSIS — R79.89 ABNORMAL CBC: Primary | ICD-10-CM

## 2021-11-19 ENCOUNTER — TELEPHONE (OUTPATIENT)
Dept: INTERNAL MEDICINE | Facility: CLINIC | Age: 36
End: 2021-11-19

## 2021-11-19 NOTE — TELEPHONE ENCOUNTER
----- Message from Jeimy Argueta sent at 11/10/2021  8:21 AM EST -----  So I sent the referral to [facility] and due to covid they need her to call so I will contact her. Thank you  ----- Message -----  From: Kelly Johnson APRN  Sent: 11/9/2021   5:37 PM EST  To: Jeimy Sanders,     I entered a referral for pt to [facility] for ADHD testing.  She messaged me concerned b/c she has not heard anything about the referral.  Can you ck on this for us?    Kelly

## 2022-06-08 DIAGNOSIS — E89.0 POSTSURGICAL HYPOTHYROIDISM: ICD-10-CM

## 2022-06-08 RX ORDER — LEVOTHYROXINE SODIUM 0.15 MG/1
TABLET ORAL
Qty: 60 TABLET | Refills: 5 | Status: SHIPPED | OUTPATIENT
Start: 2022-06-08

## 2022-06-08 RX ORDER — LEVOTHYROXINE SODIUM 175 UG/1
TABLET ORAL
Qty: 24 TABLET | Refills: 5 | Status: SHIPPED | OUTPATIENT
Start: 2022-06-08

## 2022-06-08 NOTE — TELEPHONE ENCOUNTER
----- Message from Kennedi Pennington sent at 6/7/2022  5:55 PM EDT -----  Regarding: New pharmacy  Hi, my insurance is changing and I am no longer using the [email or web address].  Could you please call my prescription in to [address] [facility], [place] [address] [phone]    Thanks!

## 2022-07-19 ENCOUNTER — OFFICE VISIT (OUTPATIENT)
Dept: ENDOCRINOLOGY | Facility: CLINIC | Age: 37
End: 2022-07-19

## 2022-07-19 ENCOUNTER — LAB (OUTPATIENT)
Dept: LAB | Facility: HOSPITAL | Age: 37
End: 2022-07-19

## 2022-07-19 VITALS
DIASTOLIC BLOOD PRESSURE: 80 MMHG | HEIGHT: 67 IN | SYSTOLIC BLOOD PRESSURE: 120 MMHG | BODY MASS INDEX: 27.47 KG/M2 | OXYGEN SATURATION: 98 % | WEIGHT: 175 LBS | HEART RATE: 98 BPM

## 2022-07-19 DIAGNOSIS — E89.0 POSTSURGICAL HYPOTHYROIDISM: Primary | ICD-10-CM

## 2022-07-19 DIAGNOSIS — C73 THYROID CANCER: ICD-10-CM

## 2022-07-19 LAB
T4 FREE SERPL-MCNC: 1.66 NG/DL (ref 0.93–1.7)
TSH SERPL DL<=0.05 MIU/L-ACNC: 1.09 UIU/ML (ref 0.27–4.2)

## 2022-07-19 PROCEDURE — 86800 THYROGLOBULIN ANTIBODY: CPT | Performed by: INTERNAL MEDICINE

## 2022-07-19 PROCEDURE — 84439 ASSAY OF FREE THYROXINE: CPT | Performed by: INTERNAL MEDICINE

## 2022-07-19 PROCEDURE — 99214 OFFICE O/P EST MOD 30 MIN: CPT | Performed by: INTERNAL MEDICINE

## 2022-07-19 PROCEDURE — 84443 ASSAY THYROID STIM HORMONE: CPT | Performed by: INTERNAL MEDICINE

## 2022-07-19 PROCEDURE — 84432 ASSAY OF THYROGLOBULIN: CPT | Performed by: INTERNAL MEDICINE

## 2022-07-19 RX ORDER — DEXTROAMPHETAMINE SACCHARATE, AMPHETAMINE ASPARTATE, DEXTROAMPHETAMINE SULFATE AND AMPHETAMINE SULFATE 2.5; 2.5; 2.5; 2.5 MG/1; MG/1; MG/1; MG/1
10 TABLET ORAL 2 TIMES DAILY
COMMUNITY

## 2022-07-19 NOTE — PROGRESS NOTES
"Chief Complaint   Patient presents with   • Hypothyroidism   • Thyroid Cancer        HPI   Kennedi Pennington is a 37 y.o. female had concerns including Hypothyroidism and Thyroid Cancer.      Patient reports she was started on Adderall for ADHD in the interim since last visit, denies any other significant health changes.  She has not had repeat thyroid function testing.  She continues on levothyroxine 150 mcg Monday through Friday, 175 mcg Saturday and Sunday.  She denies any notable neck changes, difficulty breathing, difficulty swallowing.    The following portions of the patient's history were reviewed and updated as appropriate: allergies, current medications and past social history.    Review of Systems   Constitutional: Negative for unexpected weight gain and unexpected weight loss.   HENT: Negative for trouble swallowing and voice change.    Cardiovascular: Negative for palpitations.   Gastrointestinal: Negative for constipation and diarrhea.        /80 (BP Location: Right arm, Patient Position: Sitting, Cuff Size: Adult)   Pulse 98   Ht 170.2 cm (67\")   Wt 79.4 kg (175 lb)   SpO2 98%   BMI 27.41 kg/m²      Physical Exam      Constitutional:  well developed; well nourished  no acute distress  appears stated age   ENT/Thyroid: surgically absent   Eyes: Conjunctiva: clear   Respiratory:  breathing is unlabored  clear to auscultation bilaterally   Cardiovascular:  regular rate and rhythm   Chest:  Not performed.   Abdomen: Not performed.   : Not performed.   Musculoskeletal: Not performed   Skin: not performed.   Neuro: mental status, speech normal   Psych: mood and affect are within normal limits       Labs/Imaging  Outside labs dated 7/15/2021  Thyroglobulin 0.1  Thyroglobulin antibody undetectable    Diagnoses and all orders for this visit:    1. Postsurgical hypothyroidism (Primary)  -     TSH  -     T4, Free  Goal TSH less than 2.5  Plan to repeat thyroid function testing today and adjust as " clinically indicated, currently taking levothyroxine 150 mcg Monday through Friday, 175 mcg Saturday and Sunday    2. Thyroid cancer (HCC)  -     Thyroglobulin + Thyroglobulin Antibody ([place])  Patient status post subtotal thyroidectomy in February 2009 status post 50 mcg of radioactive iodine in June 2009 for a 1.8 cm papillary thyroid cancer.  Whole-body scan reportedly negative in 2010, most recent thyroglobulin level 0.1.  Thyroid ultrasound in July 2020 was unremarkable  SCOOTER low risk based on available data, repeat labs today  Physical exam is unremarkable       Return in about 1 year (around 7/19/2023) for Next scheduled follow up. The patient was instructed to contact the clinic with any interval questions or concerns.    Dina Hernandes MD   Endocrinologist    Dictated Utilizing Dragon Dictation

## 2022-07-20 LAB — REF LAB TEST METHOD: NORMAL

## 2024-06-17 ENCOUNTER — TRANSCRIBE ORDERS (OUTPATIENT)
Dept: ADMINISTRATIVE | Facility: HOSPITAL | Age: 39
End: 2024-06-17
Payer: COMMERCIAL

## 2024-06-17 DIAGNOSIS — N63.10 MASS OF RIGHT BREAST, UNSPECIFIED QUADRANT: Primary | ICD-10-CM

## 2024-06-26 ENCOUNTER — HOSPITAL ENCOUNTER (OUTPATIENT)
Facility: HOSPITAL | Age: 39
Discharge: HOME | End: 2024-06-26
Payer: COMMERCIAL

## 2024-06-26 DIAGNOSIS — N63.10 MASS OF RIGHT BREAST, UNSPECIFIED QUADRANT: ICD-10-CM

## 2024-06-26 PROCEDURE — G0279 TOMOSYNTHESIS, MAMMO: HCPCS

## 2024-06-26 PROCEDURE — 76642 ULTRASOUND BREAST LIMITED: CPT | Performed by: RADIOLOGY

## 2024-06-26 PROCEDURE — 77066 DX MAMMO INCL CAD BI: CPT | Performed by: RADIOLOGY

## 2024-06-26 PROCEDURE — 77066 DX MAMMO INCL CAD BI: CPT

## 2024-06-26 PROCEDURE — 77062 BREAST TOMOSYNTHESIS BI: CPT | Performed by: RADIOLOGY

## 2024-06-26 PROCEDURE — 76642 ULTRASOUND BREAST LIMITED: CPT

## 2024-07-13 DIAGNOSIS — E89.0 POSTSURGICAL HYPOTHYROIDISM: ICD-10-CM

## 2024-07-15 RX ORDER — LEVOTHYROXINE SODIUM 0.15 MG/1
TABLET ORAL
Qty: 60 TABLET | Refills: 0 | Status: SHIPPED | OUTPATIENT
Start: 2024-07-15

## 2024-07-15 NOTE — TELEPHONE ENCOUNTER
Rx Refill Note    Requested Prescriptions     Pending Prescriptions Disp Refills    levothyroxine (SYNTHROID, LEVOTHROID) 150 MCG tablet [Pharmacy Med Name: Levothyroxine Sodium 150 MCG Oral Tablet] 60 tablet 0     Sig: TAKE 1 TABLET BY MOUTH ONCE DAILY MONDAY  THROUGH  FRIDAY        Last office visit with prescribing clinician: 7/18/2023       Next office visit with prescribing clinician: 7/23/2024     {    Emerald Lamb MA  07/15/24, 11:39 EDT

## 2024-07-23 ENCOUNTER — OFFICE VISIT (OUTPATIENT)
Dept: ENDOCRINOLOGY | Facility: CLINIC | Age: 39
End: 2024-07-23
Payer: COMMERCIAL

## 2024-07-23 VITALS
OXYGEN SATURATION: 98 % | HEART RATE: 80 BPM | SYSTOLIC BLOOD PRESSURE: 124 MMHG | WEIGHT: 170 LBS | HEIGHT: 66 IN | DIASTOLIC BLOOD PRESSURE: 70 MMHG | BODY MASS INDEX: 27.32 KG/M2

## 2024-07-23 DIAGNOSIS — E89.0 POSTOPERATIVE HYPOTHYROIDISM: ICD-10-CM

## 2024-07-23 DIAGNOSIS — C73 THYROID CANCER: Primary | ICD-10-CM

## 2024-07-23 DIAGNOSIS — E89.0 POSTSURGICAL HYPOTHYROIDISM: ICD-10-CM

## 2024-07-23 PROCEDURE — 84443 ASSAY THYROID STIM HORMONE: CPT | Performed by: INTERNAL MEDICINE

## 2024-07-23 PROCEDURE — 84439 ASSAY OF FREE THYROXINE: CPT | Performed by: INTERNAL MEDICINE

## 2024-07-23 PROCEDURE — 84432 ASSAY OF THYROGLOBULIN: CPT | Performed by: INTERNAL MEDICINE

## 2024-07-23 PROCEDURE — 86800 THYROGLOBULIN ANTIBODY: CPT | Performed by: INTERNAL MEDICINE

## 2024-07-23 PROCEDURE — 99214 OFFICE O/P EST MOD 30 MIN: CPT | Performed by: INTERNAL MEDICINE

## 2024-07-23 PROCEDURE — 36415 COLL VENOUS BLD VENIPUNCTURE: CPT | Performed by: INTERNAL MEDICINE

## 2024-07-23 RX ORDER — ESCITALOPRAM OXALATE 10 MG/1
1 TABLET, FILM COATED ORAL DAILY
COMMUNITY
Start: 2024-05-14

## 2024-07-23 RX ORDER — NORETHINDRONE ACETATE/ETHINYL ESTRADIOL AND FERROUS FUMARATE 1MG-20(24)
1 KIT ORAL DAILY
COMMUNITY
Start: 2024-06-11

## 2024-07-24 LAB
T4 FREE SERPL-MCNC: 1.53 NG/DL (ref 0.92–1.68)
TSH SERPL DL<=0.05 MIU/L-ACNC: 0.51 UIU/ML (ref 0.27–4.2)

## 2024-07-24 RX ORDER — LEVOTHYROXINE SODIUM 175 UG/1
TABLET ORAL
Qty: 24 TABLET | Refills: 3 | Status: SHIPPED | OUTPATIENT
Start: 2024-07-24

## 2024-07-24 RX ORDER — LEVOTHYROXINE SODIUM 0.15 MG/1
TABLET ORAL
Qty: 60 TABLET | Refills: 3 | Status: SHIPPED | OUTPATIENT
Start: 2024-07-24

## 2024-07-25 LAB
THYROGLOB AB SERPL-ACNC: <1 IU/ML (ref 0–0.9)
THYROGLOB SERPL-MCNC: <0.1 NG/ML (ref 1.5–38.5)

## 2024-09-13 DIAGNOSIS — E89.0 POSTSURGICAL HYPOTHYROIDISM: ICD-10-CM

## 2024-09-13 RX ORDER — LEVOTHYROXINE SODIUM 175 UG/1
TABLET ORAL
Qty: 24 TABLET | Refills: 3 | Status: CANCELLED | OUTPATIENT
Start: 2024-09-13

## 2025-07-23 ENCOUNTER — OFFICE VISIT (OUTPATIENT)
Dept: ENDOCRINOLOGY | Facility: CLINIC | Age: 40
End: 2025-07-23
Payer: COMMERCIAL

## 2025-07-23 VITALS
HEART RATE: 77 BPM | WEIGHT: 179 LBS | SYSTOLIC BLOOD PRESSURE: 120 MMHG | HEIGHT: 66 IN | DIASTOLIC BLOOD PRESSURE: 68 MMHG | BODY MASS INDEX: 28.77 KG/M2

## 2025-07-23 DIAGNOSIS — E89.0 POSTSURGICAL HYPOTHYROIDISM: Primary | ICD-10-CM

## 2025-07-23 DIAGNOSIS — C73 THYROID CANCER: ICD-10-CM

## 2025-07-23 LAB
T4 FREE SERPL-MCNC: 1.87 NG/DL (ref 0.92–1.68)
TSH SERPL DL<=0.05 MIU/L-ACNC: 0.67 UIU/ML (ref 0.27–4.2)

## 2025-07-23 PROCEDURE — 84432 ASSAY OF THYROGLOBULIN: CPT | Performed by: INTERNAL MEDICINE

## 2025-07-23 PROCEDURE — 84443 ASSAY THYROID STIM HORMONE: CPT | Performed by: INTERNAL MEDICINE

## 2025-07-23 PROCEDURE — 84439 ASSAY OF FREE THYROXINE: CPT | Performed by: INTERNAL MEDICINE

## 2025-07-23 PROCEDURE — 86800 THYROGLOBULIN ANTIBODY: CPT | Performed by: INTERNAL MEDICINE

## 2025-07-23 NOTE — PROGRESS NOTES
"Chief Complaint   Patient presents with    Thyroid Problem        HPI   Kennedi Pennington is a 40 y.o. female had concerns including Thyroid Problem.      Patient presents for follow-up of postsurgical hypothyroidism.  She is currently taking levothyroxine 150 mcg Monday through Friday, 175 mcg Saturday and Sunday.  She was last seen 1 year ago.  She has had some interval weight gain.  However, she reports she is currently near her baseline adult weight.  At last visit, she had lost weight due to ongoing stress.  She denies symptomatic concerns.  She does report some issues with timing of prescriptions at pharmacy.    The following portions of the patient's history were reviewed and updated as appropriate: allergies, current medications, and past social history.    Review of Systems   Constitutional:  Negative for unexpected weight loss.   Cardiovascular:  Negative for palpitations.        /68   Pulse 77   Ht 167.6 cm (65.98\")   Wt 81.2 kg (179 lb)   BMI 28.91 kg/m²      Physical Exam      Constitutional:  well developed; well nourished  no acute distress  appears stated age   ENT/Thyroid: surgically absent   Eyes: Conjunctiva: clear   Respiratory:  breathing is unlabored  clear to auscultation bilaterally   Cardiovascular:  regular rate and rhythm   Chest:  Not performed.   Abdomen: Not performed.   : Not performed.   Musculoskeletal: Not performed   Skin: not performed.   Neuro: mental status, speech normal   Psych: mood and affect are within normal limits       Labs/Imaging   Latest Reference Range & Units 07/23/24 15:29   TSH Baseline 0.270 - 4.200 uIU/mL 0.506   Free T4 0.92 - 1.68 ng/dL 1.53   Thyroglobulin Ab 0.0 - 0.9 IU/mL <1.0   THYROGLOBULIN BY MONIK 1.5 - 38.5 ng/mL <0.1 (L)   (L): Data is abnormally low    Diagnoses and all orders for this visit:    1. Postsurgical hypothyroidism (Primary)  -     TSH; Future  -     T4, Free; Future  -     TSH  -     T4, Free  Patient is currently taking " levothyroxine 150 mcg daily Monday through Friday, 175 mcg on Saturday and Sunday.  Consider transitioning therapy based on labs to maintain single prescription given issues with timing of medication  at pharmacy.  Plan to update labs today and adjust medication as medically indicated.  Patient is clinically euthyroid.    2. Thyroid cancer  -     Thyroglobulin Antibody and Thyroglobulin, MONIK or LC/MS-MS; Future  -     Thyroglobulin Antibody and Thyroglobulin, MONIK or LC/MS-MS  Patient status post subtotal thyroidectomy in February 2009 status post 50 mcg of radioactive iodine in June 2009 for a 1.8 cm papillary thyroid cancer.  Whole-body scan reportedly negative in 2010, most recent thyroglobulin level <0.1.  Thyroid ultrasound in July 2020 was unremarkable.  SCOOTER low risk based on available data, repeat labs today.       Return in about 1 year (around 7/23/2026). The patient was instructed to contact the clinic with any interval questions or concerns.    Electronically signed by: Dina Hernandes MD   Endocrinologist    Dictated Utilizing Dragon Dictation

## 2025-07-24 LAB
THYROGLOB AB SERPL-ACNC: <1 IU/ML (ref 0–0.9)
THYROGLOB SERPL-MCNC: <0.1 NG/ML (ref 1.5–38.5)

## 2025-07-31 DIAGNOSIS — E89.0 POSTSURGICAL HYPOTHYROIDISM: ICD-10-CM

## 2025-07-31 RX ORDER — LEVOTHYROXINE SODIUM 150 UG/1
TABLET ORAL
Qty: 97 TABLET | Refills: 3 | OUTPATIENT
Start: 2025-07-31

## 2025-07-31 RX ORDER — LEVOTHYROXINE SODIUM 150 UG/1
TABLET ORAL
Qty: 20 TABLET | Refills: 0 | OUTPATIENT
Start: 2025-07-31

## 2025-07-31 RX ORDER — LEVOTHYROXINE SODIUM 175 UG/1
TABLET ORAL
Qty: 8 TABLET | Refills: 0 | OUTPATIENT
Start: 2025-07-31